# Patient Record
Sex: FEMALE | Race: WHITE
[De-identification: names, ages, dates, MRNs, and addresses within clinical notes are randomized per-mention and may not be internally consistent; named-entity substitution may affect disease eponyms.]

---

## 2017-03-09 ENCOUNTER — HOSPITAL ENCOUNTER (EMERGENCY)
Dept: HOSPITAL 59 - ER | Age: 32
Discharge: HOME | End: 2017-03-09
Payer: MEDICAID

## 2017-03-09 DIAGNOSIS — H92.01: Primary | ICD-10-CM

## 2017-03-09 PROCEDURE — 99282 EMERGENCY DEPT VISIT SF MDM: CPT

## 2017-03-09 NOTE — EMERGENCY DEPARTMENT RECORD
History of Present Illness





- General


Chief complaint: ENT


Stated complaint: EAR PAIN


Time Seen by Provider: 17 12:52


Source: Patient


Mode of Arrival: Ambulatory


Limitations: No limitations





- History of Present Illness


Initial comments: 


The patient is here due to a one week hx of intermittent R ear pain. The pain 

is mainly located near the R TMJ area. It comes and goes and is gone presently. 

She denies any ST, fever, dental pain, or ear drainage. She did see someone 

last week in [place] and was given ear drops but they have not helped.





MD complaint: Ear pain


Onset/Timin


-: Week(s)


Location: R ear


Severity: Moderate


Severity scale (1-10): 6


Quality: Sharp


Consistency: Constant


Improves with: None


Worsens with: None





- Related Data


 Home Medications











 Medication  Instructions  Recorded  Confirmed  Last Taken


 


Birth Control Pill  14 Unknown


 


Omeprazole 20 mg PO DAILY 17 Unknown


 


Venlafaxine HCl [Effexor] 75 mg PO DAILY 17 Unknown








 Previous Rx's











 Medication  Instructions  Recorded


 


Naproxen [Naprosyn] 500 mg PO BID #14 tablet. 17











 Allergies











Allergy/AdvReac Type Severity Reaction Status Date / Time


 


acetaminophen [From Vicodin] AdvReac  NAUSEA AND Verified 14 15:54





   VOMITING  


 


hydrocodone bitartrate AdvReac  NAUSEA AND Verified 14 15:54





[From Vicodin]   VOMITING  














Travel Screening





- Travel/Exposure Within Last 30 Days


Have you traveled within the last 30 days?: No





Review of Systems


Constitutional: Denies: Chills, Fever


Eyes: Denies: Eye discharge


ENT: Denies: Congestion


Respiratory: Denies: Cough, Dyspnea





Past Medical History





- SOCIAL HISTORY


Smoking Status: Never smoker





- RESPIRATORY


Hx Respiratory Disorders: No





- CARDIOVASCULAR


Hx Cardio Disorders: Yes


Hx Palpitations: Yes





- NEURO


Hx Neuro Disorders: No





- GI


Hx GI Disorders: No





- 


Hx Genitourinary Disorders: No





- ENDOCRINE


Hx Endocrine Disorders: No





- MUSCULOSKELETAL


Hx Musculoskeletal Disorders: No





- PSYCH


Hx Psych Problems: No





- HEMATOLOGY/ONCOLOGY


Hx Hematology/Oncology Disorders: No





Family Medical History


Any Significant Family History?: No





Physical Exam





- General


General Appearance: Alert, Oriented x3, Cooperative, No acute distress





- Head


Head exam: Atraumatic, Normocephalic, Normal inspection





- Eye


Eye exam: Normal appearance, PERRL





- ENT


ENT exam: Normal exam, Mucous membranes moist, Normal external ear exam, Normal 

orophraynx, TM's normal bilaterally


Ear exam: Normal external inspection.  negative: Auricular hematoma, Auricular 

trauma, External canal tenderness


Teeth exam: Normal inspection.  negative: Dental caries


Throat exam: Normal inspection.  negative: Tonsillar erythema, Tonsillar exudate





- Neck


Neck exam: Normal inspection, Full ROM.  negative: Lymphadenopathy, Meningismus

, Tenderness





- Respiratory


Respiratory exam: Normal lung sounds bilaterally.  negative: Respiratory 

distress





Course





 Vital Signs











  17





  12:46


 


Temperature 97.9 F


 


Pulse Rate 72


 


Respiratory 12





Rate 


 


Blood Pressure 115/67


 


Pulse Ox 99














- Reevaluation(s)


Reevaluation #1: 


I explained to the patient that the ear appears normal and there is no evidence 

for any infection. She is to see her PCP next week for recheck.





17 13:00








Disposition


Disposition: Discharge


Clinical Impression: 


 Ear pain, right


Disposition: Home, Self-Care


Condition: (1) Good


Instructions:  Earache (ED)


Additional Instructions: 


Please take Naprosyn for pain. Please see your PCP for recheck next week.


Prescriptions: 


Naproxen [Naprosyn] 500 mg PO BID #14 tablet.dr


Forms:  Patient Portal Access


Time of Disposition: 12:58

## 2018-05-22 ENCOUNTER — HOSPITAL ENCOUNTER (EMERGENCY)
Dept: HOSPITAL 59 - ER | Age: 33
Discharge: HOME | End: 2018-05-22
Payer: MEDICAID

## 2018-05-22 DIAGNOSIS — H10.31: Primary | ICD-10-CM

## 2018-05-22 PROCEDURE — 99282 EMERGENCY DEPT VISIT SF MDM: CPT

## 2018-05-22 NOTE — EMERGENCY DEPARTMENT RECORD
History of Present Illness





- General


Chief complaint: Eye Problem


Stated complaint: SOMETHING IN RT EYE


Time Seen by Provider: 18 19:56


Source: Patient


Mode of Arrival: Ambulatory


Limitations: No limitations


Travel/Exposure to [place] Within 21 Days of Symptoms: No





- History of Present Illness


Initial comments: 





32 yo female presents to ED for evaluation of redness and irritation to the 

right eye that began 24 hours ago, reports "I feel like there is something in 

the eye".  Patient denies specific injury, reports that she noticed her eye 

symptoms shortly after having intercourse with her significant other.  Patient 

denies the use of contact lenses, and reports irrigating the eye last night as 

she felt there may be a hair in the eye.  Patient also reports drainage from 

the eye this morning.


MD chief complaint: Eye redness, Foreign body


Onset/Timin


-: Days(s)


Onset Description: Gradual


Location: Right eye


Place: Home


Eye Symptoms: Blurry vision, Itching, Redness


Severity: Moderate


Severity scale (1-10): 3


If Pain, Quality: Aching


Consistency: Constant


Associated Symptoms: None


Treatments Prior to Arrival: None





- Related Data


Visual acuity (L) = 20/: 20


Visual acuity (R) = 20/: 50


With correction: Yes (near and f)


Hx Tetanus Toxoid Vaccination: Yes


Year of Tetanus Vaccination: unknown


Patient Tetanus UTD (within 5 yrs): Yes (3 years ago)


 Home Medications











 Medication  Instructions  Recorded  Confirmed  Last Taken


 


Loratadine [Allergy Relief] 10 mg PO DAILY 18 1 Day Ago





    ~18








 Previous Rx's











 Medication  Instructions  Recorded


 


Erythromycin Base [Erythromycin 1 apply AFFEYE BID #1 tube 18





OPTH Ointment]  











 Allergies











Allergy/AdvReac Type Severity Reaction Status Date / Time


 


hydrocodone bitartrate AdvReac  NAUSEA AND Verified 18 19:44





[From Vicodin]   VOMITING  














Travel Screening





- Travel/Exposure Within Last 30 Days


Have you traveled within the last 30 days?: No





- Travel/Exposure Within Last Year


Have you traveled outside the [place] in the last year?: No





- Additonal Travel Details


Have you been exposed to anyone with a communicable illness?: No





- Travel Symptoms


Symptom Screening: None





Review of Systems


Constitutional: Denies: Chills, Fever, Malaise, Night sweats


Eyes: Reports: Eye discharge, Vision change.  Denies: Eye pain


ENT: Denies: Congestion, Ear pain, Epistaxis


Respiratory: Denies: Cough, Dyspnea


Cardiovascular: Denies: Chest pain, Dyspnea on exertion


Endocrine: Denies: Fatigue, Heat or cold intolerance


Gastrointestinal: Denies: Abdominal pain, Nausea, Vomiting


Genitourinary: Denies: Incontinence, Retention


Musculoskeletal: Denies: Arthralgia, Back pain, Gout, Joint swelling


Skin: Denies: Bruising, Change in color


Neurological: Denies: Abnormal gait, Confusion, Headache, Tingling, Tremors


Psychiatric: Denies: Anxiety


Hematological/Lymphatic: Denies: Anemia, Blood Clots





Past Medical History





- SOCIAL HISTORY


Smoking Status: Never smoker


Alcohol Use: None


Drug Use: None





- RESPIRATORY


Hx Respiratory Disorders: No





- CARDIOVASCULAR


Hx Cardio Disorders: Yes


Hx Palpitations: Yes





- NEURO


Hx Neuro Disorders: No





- GI


Hx GI Disorders: No





- 


Hx Genitourinary Disorders: No





- ENDOCRINE


Hx Endocrine Disorders: No





- MUSCULOSKELETAL


Hx Musculoskeletal Disorders: No





- PSYCH


Hx Psych Problems: No





- HEMATOLOGY/ONCOLOGY


Hx Hematology/Oncology Disorders: No





Family Medical History


Any Significant Family History?: Yes





Physical Exam





- General


General Appearance: Alert, Oriented x3, Cooperative, Mild distress


Limitations: No limitations





- Head


Head exam: Atraumatic, Normocephalic, Normal inspection


Head exam detail: negative: Abrasion, Contusion, Bean's sign, General 

tenderness, Hematoma, Laceration





- Eye


Eye exam: PERRL, Conjunctival injection, Other (There is no florurseceine 

uptake on examination to suggest corneal abrasion, no FB identified, Negative 

Paulo's sign, and no FB identified upper/lower lid eversion.).  negative: 

Periorbital swelling, Periorbital tenderness, Scleral icterus


With correction: Yes (near and f)





- ENT


Ear exam: negative: Auricular hematoma, Auricular trauma


Nasal Exam: negative: Active bleeding, Discharge, Dried blood, Foreign body


Mouth exam: negative: Drooling, Laceration, Muffled voice, Tongue elevation





- Neck


Neck exam: Normal inspection.  negative: Meningismus, Tenderness





- Respiratory


Respiratory exam: Normal lung sounds bilaterally.  negative: Rhonchi, Stridor, 

Wheezes





- Cardiovascular


Cardiovascular Exam: Regular rate, Normal rhythm, Normal heart sounds





- GI/Abdominal


GI/Abdominal exam: Soft.  negative: Distended, Rebound, Rigid, Tenderness





- Rectal


Rectal exam: Deferred





- 


 exam: Deferred





- Extremities


Extremities exam: Normal inspection.  negative: Calf tenderness, Pedal edema, 

Tenderness





- Back


Back exam: Denies: CVA tenderness (R), CVA tenderness (L)





- Neurological


Neurological exam: Alert, Normal gait, Oriented X3





- Psychiatric


Psychiatric exam: Normal affect, Normal mood





- Skin


Skin exam: Normal color.  negative: Abrasion


Type of lesion: negative: abrasion





Course





 Vital Signs











  18





  19:37


 


Temperature 98.0 F


 


Pulse Rate 80


 


Respiratory 20





Rate 


 


Blood Pressure 110/66


 


Pulse Ox 97














- Reevaluation(s)


Reevaluation #1: 





18 20:02


There is no florursecein uptake on examination to suggest corneal abrasion, no 

FB identified, Negative Siedel's sign, and no FB identified upper/lower lid 

eversion.


No dendrites present on examination.


Anterior chamber appears clear with no haziness. blood, or infection.


VA 20/20 left, 20/50 right


Symptoms appear c/w conjunctivitis of the right eye, will prescribe 

erythromycin drops for treatment for possible STD vs. other infection to the 

eye with instructions to follow-up with Wilian Rivera tomorrow for re-evaluation.








Disposition


Disposition: Discharge


Clinical Impression: 


Conjunctivitis


Qualifiers:


 Conjunctivitis type: acute Acute conjunctivitis type: unspecified Laterality: 

right Qualified Code(s): H10.31 - Unspecified acute conjunctivitis, right eye





Disposition: Home, Self-Care


Condition: (2) Stable


Instructions:  Conjunctivitis (ED)


Additional Instructions: 


Return to ED if your symptoms worsen or if you have any concerns.


Erythromycin eye drops as directed.


Call Dr. Rivera for follow-up appointment tomorrow.


Prescriptions: 


Erythromycin Base [Erythromycin OPTH Ointment] 1 apply AFFEYE BID #1 tube


Referrals: 


WEN RIVERA [MEDICAL DOCTOR] - 


Time of Disposition: 20:06





Quality





- Quality Measures


Quality Measures: N/A





- Blood Pressure Screening


Does Patient Have Any of the Following: No


Blood Pressure Classification: Normal BP Reading


Systolic Measurement: 110


Diastolic Measurement: 66


Screening for High Blood Pressure: < Normal BP, F/U Not Required > []